# Patient Record
Sex: MALE | Race: WHITE | NOT HISPANIC OR LATINO | Employment: OTHER | URBAN - METROPOLITAN AREA
[De-identification: names, ages, dates, MRNs, and addresses within clinical notes are randomized per-mention and may not be internally consistent; named-entity substitution may affect disease eponyms.]

---

## 2024-05-05 ENCOUNTER — HOSPITAL ENCOUNTER (EMERGENCY)
Facility: HOSPITAL | Age: 29
Discharge: HOME | End: 2024-05-05
Attending: EMERGENCY MEDICINE
Payer: OTHER GOVERNMENT

## 2024-05-05 ENCOUNTER — APPOINTMENT (OUTPATIENT)
Dept: GENERAL RADIOLOGY | Facility: HOSPITAL | Age: 29
End: 2024-05-05
Payer: OTHER GOVERNMENT

## 2024-05-05 VITALS
OXYGEN SATURATION: 99 % | DIASTOLIC BLOOD PRESSURE: 90 MMHG | WEIGHT: 225.09 LBS | RESPIRATION RATE: 22 BRPM | SYSTOLIC BLOOD PRESSURE: 121 MMHG | BODY MASS INDEX: 32.22 KG/M2 | HEART RATE: 81 BPM | TEMPERATURE: 97.8 F | HEIGHT: 70 IN

## 2024-05-05 DIAGNOSIS — M24.411 SHOULDER DISLOCATION, RECURRENT, RIGHT: Primary | ICD-10-CM

## 2024-05-05 PROCEDURE — 73030 X-RAY EXAM OF SHOULDER: CPT

## 2024-05-05 PROCEDURE — 96375 TX/PRO/DX INJ NEW DRUG ADDON: CPT

## 2024-05-05 PROCEDURE — 25010000002 HYDROMORPHONE 1 MG/ML SOLUTION: Performed by: EMERGENCY MEDICINE

## 2024-05-05 PROCEDURE — 25010000002 ONDANSETRON PER 1 MG: Performed by: EMERGENCY MEDICINE

## 2024-05-05 PROCEDURE — 99285 EMERGENCY DEPT VISIT HI MDM: CPT

## 2024-05-05 PROCEDURE — 96374 THER/PROPH/DIAG INJ IV PUSH: CPT

## 2024-05-05 RX ORDER — HYDROCODONE BITARTRATE AND ACETAMINOPHEN 5; 325 MG/1; MG/1
1 TABLET ORAL EVERY 6 HOURS PRN
Qty: 12 TABLET | Refills: 0 | Status: SHIPPED | OUTPATIENT
Start: 2024-05-05 | End: 2024-05-05

## 2024-05-05 RX ORDER — CYCLOBENZAPRINE HCL 10 MG
10 TABLET ORAL 3 TIMES DAILY
Qty: 20 TABLET | Refills: 0 | Status: SHIPPED | OUTPATIENT
Start: 2024-05-05

## 2024-05-05 RX ORDER — SODIUM CHLORIDE 0.9 % (FLUSH) 0.9 %
10 SYRINGE (ML) INJECTION AS NEEDED
Status: DISCONTINUED | OUTPATIENT
Start: 2024-05-05 | End: 2024-05-05 | Stop reason: HOSPADM

## 2024-05-05 RX ORDER — ONDANSETRON 2 MG/ML
4 INJECTION INTRAMUSCULAR; INTRAVENOUS ONCE
Status: COMPLETED | OUTPATIENT
Start: 2024-05-05 | End: 2024-05-05

## 2024-05-05 RX ORDER — CYCLOBENZAPRINE HCL 10 MG
10 TABLET ORAL 3 TIMES DAILY
Qty: 20 TABLET | Refills: 0 | Status: SHIPPED | OUTPATIENT
Start: 2024-05-05 | End: 2024-05-05

## 2024-05-05 RX ORDER — HYDROCODONE BITARTRATE AND ACETAMINOPHEN 5; 325 MG/1; MG/1
1 TABLET ORAL EVERY 6 HOURS PRN
Qty: 12 TABLET | Refills: 0 | Status: SHIPPED | OUTPATIENT
Start: 2024-05-05

## 2024-05-05 RX ORDER — ETOMIDATE 2 MG/ML
0.15 INJECTION INTRAVENOUS ONCE
Status: COMPLETED | OUTPATIENT
Start: 2024-05-05 | End: 2024-05-05

## 2024-05-05 RX ADMIN — ONDANSETRON 4 MG: 2 INJECTION INTRAMUSCULAR; INTRAVENOUS at 15:11

## 2024-05-05 RX ADMIN — ETOMIDATE 15.3 MG: 40 INJECTION, SOLUTION INTRAVENOUS at 15:29

## 2024-05-05 RX ADMIN — HYDROMORPHONE HYDROCHLORIDE 1 MG: 1 INJECTION, SOLUTION INTRAMUSCULAR; INTRAVENOUS; SUBCUTANEOUS at 15:12

## 2024-05-05 NOTE — ED PROVIDER NOTES
"SHARED VISIT NOTE:    Patient is 28 y.o. year old male that presents to the ED for evaluation of right shoulder dislocation.     Physical Exam    ED Course:    /90 (BP Location: Left arm, Patient Position: Lying)   Pulse 81   Temp 97.8 °F (36.6 °C) (Oral)   Resp 22   Ht 177.8 cm (70\")   Wt 102 kg (225 lb 1.4 oz)   SpO2 99%   BMI 32.30 kg/m²   No results found for this or any previous visit.  Medications   etomidate (AMIDATE) injection 15.3 mg (15.3 mg Intravenous Given 5/5/24 1529)   HYDROmorphone (DILAUDID) injection 1 mg (1 mg Intravenous Given 5/5/24 1512)   ondansetron (ZOFRAN) injection 4 mg (4 mg Intravenous Given 5/5/24 1511)     XR Shoulder 2+ View Right    Result Date: 5/5/2024  Narrative: XR SHOULDER 2+ VW RIGHT-  Date of Exam: 5/5/2024 3:52 PM  Indication: Closed reduction; M24.411-Recurrent dislocation, right shoulder  Comparison: 5/5/2024 2:22 p.m.  Findings: Interval closed reduction of the right glenohumeral joint anterior dislocation. No fractures are identified. The right AC joint is intact.      Impression: Impression: Interval closed reduction of the right glenohumeral joint anterior dislocation.   Electronically Signed By-MASHA GARCIA MD On:5/5/2024 3:58 PM      XR Shoulder 2+ View Right    Result Date: 5/5/2024  Narrative: XR SHOULDER 2+ VW RIGHT-  Date of Exam: 5/5/2024 2:15 PM  Indication: injury  Comparison: None available.  Findings: There is anterior dislocation of the right glenohumeral joint. The right AC joint is intact. No fractures are identified. Prior median sternotomy.      Impression: Impression: Anterior dislocation of the right glenohumeral joint.   Electronically Signed By-MASHA GARCIA MD On:5/5/2024 2:50 PM       MDM:    Upper Extremity Dislocation    Date/Time: 5/5/2024 4:07 PM    Performed by: Taqueria Samaniego DO  Authorized by: Taqueria Samaniego DO  Consent: Written consent obtained.  Consent given by: patient  Patient identity confirmed: verbally with patient and " arm band  Injury location: shoulder  Location details: right shoulder  Injury type: dislocation  Dislocation type: anterior  Hill-Sachs deformity: no  Chronicity: recurrent  Pre-procedure neurovascular assessment: neurovascularly intact  Pre-procedure range of motion: reduced    Anesthesia:  Local anesthesia used: no  Manipulation performed: yes  Reduction method: traction and counter traction  Reduction successful: yes  X-ray confirmed reduction: yes  Immobilization: sling  Post-procedure neurovascular assessment: post-procedure neurovascularly intact  Patient tolerance: patient tolerated the procedure well with no immediate complications          All X-rays impressions were independently interpreted by me.      SHARED VISIT ATTESTATION:    This visit was performed by both myself and an APC.  I performed the substantive portion of the medical decision making. The management plan was made or approved by me, and I take responsibility for patient management.           Taqueria Samaniego DO  19:54 EDT  05/05/24         Taqueria Samaniego DO  05/05/24 1955

## 2024-05-05 NOTE — Clinical Note
[facility] EMERGENCY ROOM  [address] [address]  Phone: [phone]  Fax: [phone]    Ramírez Carl was seen and treated in our emergency department on 5/5/2024.  He may return to work on 05/09/2024.         Thank you for choosing [facility].    Loyda Dotson PA-C

## 2024-05-05 NOTE — DISCHARGE INSTRUCTIONS
Please wear sling over the next week  Take Tylenol/Motrin for pain.  I have put in referral for orthopedics for follow-up

## 2024-05-05 NOTE — ED PROVIDER NOTES
"Time: 2:39 PM EDT  Date of encounter:  5/5/2024  Independent Historian/Clinical History and Information was obtained by:   Patient    History is limited by: N/A    Chief Complaint   Patient presents with    Shoulder Injury         History of Present Illness:  Patient is a 28 y.o. year old male who presents to the emergency department for evaluation of shoulder dislocation.  Patient states that he was sitting awkwardly on the couch try to support himself with his left arm while petting his dog and felt a pop and a crack sound.  Fell off the couch onto his left shoulder.  States he had a prior right shoulder dislocation.    Patient Care Team  Primary Care Provider: Nick Marshall MD    Past Medical History:     No Known Allergies  History reviewed. No pertinent past medical history.  History reviewed. No pertinent surgical history.  History reviewed. No pertinent family history.    Home Medications:  Prior to Admission medications    Not on File        Social History:   Social History     Tobacco Use    Smoking status: Never    Smokeless tobacco: Never   Substance Use Topics    Alcohol use: Not Currently    Drug use: Never         Review of Systems:  Review of Systems   Constitutional: Negative.    HENT: Negative.     Eyes: Negative.    Respiratory: Negative.     Cardiovascular: Negative.    Gastrointestinal: Negative.    Endocrine: Negative.    Genitourinary: Negative.    Musculoskeletal:  Positive for arthralgias.   Skin: Negative.    Allergic/Immunologic: Negative.    Neurological: Negative.    Hematological: Negative.    Psychiatric/Behavioral: Negative.          Physical Exam:  /94 (BP Location: Left arm, Patient Position: Lying)   Pulse 76   Temp 98.4 °F (36.9 °C)   Resp 20   Ht 177.8 cm (70\")   Wt 102 kg (225 lb 1.4 oz)   SpO2 100%   BMI 32.30 kg/m²         Physical Exam  Vitals and nursing note reviewed.   Constitutional:       Appearance: Normal appearance.   HENT:      Head: Normocephalic " and atraumatic.      Nose: Nose normal.      Mouth/Throat:      Mouth: Mucous membranes are moist.   Eyes:      Extraocular Movements: Extraocular movements intact.      Conjunctiva/sclera: Conjunctivae normal.      Pupils: Pupils are equal, round, and reactive to light.   Cardiovascular:      Rate and Rhythm: Normal rate and regular rhythm.      Heart sounds: Normal heart sounds.   Pulmonary:      Effort: Pulmonary effort is normal.      Breath sounds: Normal breath sounds.   Musculoskeletal:         General: Tenderness and deformity present. Normal range of motion.      Right shoulder: Deformity and tenderness present.      Right hand: Normal capillary refill. Normal pulse.      Cervical back: Normal range of motion and neck supple.   Skin:     General: Skin is warm and dry.      Capillary Refill: Capillary refill takes less than 2 seconds.   Neurological:      General: No focal deficit present.      Mental Status: He is alert and oriented to person, place, and time.   Psychiatric:         Mood and Affect: Mood normal.         Behavior: Behavior normal.                 Procedures:  Procedures      Medical Decision Making:      Comorbidities that affect care:    None    External Notes reviewed:    None      The following orders were placed and all results were independently analyzed by me:  Orders Placed This Encounter   Procedures    XR Shoulder 2+ View Right    XR Shoulder 2+ View Right    Ambulatory Referral to Orthopedic Surgery    Procedural Sedation    Continuous Pulse Oximetry    Obtain Informed Consent    Oxygen Therapy- Nasal Cannula; 2 LPM    Insert Peripheral IV       Medications Given in the Emergency Department:  Medications   sodium chloride 0.9 % flush 10 mL (has no administration in time range)   etomidate (AMIDATE) injection 15.3 mg (15.3 mg Intravenous Given 5/5/24 1529)   HYDROmorphone (DILAUDID) injection 1 mg (1 mg Intravenous Given 5/5/24 1512)   ondansetron (ZOFRAN) injection 4 mg (4 mg  Intravenous Given 5/5/24 1511)        ED Course:    The patient was initially evaluated in the triage area where orders were placed. The patient was later dispositioned by Loyda Dotson PA-C.      The patient was advised to stay for completion of workup which includes but is not limited to communication of labs and radiological results, reassessment and plan. The patient was advised that leaving prior to disposition by a provider could result in critical findings that are not communicated to the patient.          Labs:    Lab Results (last 24 hours)       ** No results found for the last 24 hours. **             Imaging:    XR Shoulder 2+ View Right    Result Date: 5/5/2024  XR SHOULDER 2+ VW RIGHT-  Date of Exam: 5/5/2024 2:15 PM  Indication: injury  Comparison: None available.  Findings: There is anterior dislocation of the right glenohumeral joint. The right AC joint is intact. No fractures are identified. Prior median sternotomy.      Impression: Anterior dislocation of the right glenohumeral joint.   Electronically Signed By-MASHA GARCIA MD On:5/5/2024 2:50 PM         Differential Diagnosis and Discussion:      Joint Pain: Differential diagnosis includes but is not limited to polyarticular arthritis, gout, tendinitis, hemarthrosis, septic arthritis, rheumatoid arthritis, bursitis, degenerative joint disease, joint effusion, autoimmune disorder, trauma, and occult neoplasm.    All X-rays impressions were independently interpreted by me.    MDM     Amount and/or Complexity of Data Reviewed  Tests in the radiology section of CPT®: reviewed                 Patient Care Considerations:    LABS: I considered ordering labs, however no other complaints      Consultants/Shared Management Plan:    SHARED VISIT: I have discussed the case with my supervising physician, Dr. Samaniego who states he will complete the shoulder reduction.. The substantive portion of the medical decision was made by the attesting physician  who made or approve the management plan and will take responsibility for the patient.  Clinical findings were discussed and ultimate disposition was made in consult with supervising physician.    Social Determinants of Health:    Patient is independent, reliable, and has access to care.       Disposition and Care Coordination:    Discharged: The patient is suitable and stable for discharge with no need for consideration of admission.    I have explained the patient´s condition, diagnoses and treatment plan based on the information available to me at this time. I have answered questions and addressed any concerns. The patient has a good  understanding of the patient´s diagnosis, condition, and treatment plan as can be expected at this point. The vital signs have been stable. The patient´s condition is stable and appropriate for discharge from the emergency department.      The patient will pursue further outpatient evaluation with the primary care physician or other designated or consulting physician as outlined in the discharge instructions. They are agreeable to this plan of care and follow-up instructions have been explained in detail. The patient has received these instructions in written format and has expressed an understanding of the discharge instructions. The patient is aware that any significant change in condition or worsening of symptoms should prompt an immediate return to this or the closest emergency department or call to 911.    Final diagnoses:   Shoulder dislocation, recurrent, right        ED Disposition       ED Disposition   Discharge    Condition   Stable    Comment   --               This medical record created using voice recognition software.             Loyda Dotson PA-C  05/05/24 [id number]